# Patient Record
Sex: MALE | Race: WHITE | Employment: FULL TIME | URBAN - METROPOLITAN AREA
[De-identification: names, ages, dates, MRNs, and addresses within clinical notes are randomized per-mention and may not be internally consistent; named-entity substitution may affect disease eponyms.]

---

## 2022-05-03 ENCOUNTER — HOSPITAL ENCOUNTER (OUTPATIENT)
Dept: PHYSICAL THERAPY | Age: 57
Discharge: HOME | End: 2022-05-03
Payer: COMMERCIAL

## 2022-05-03 PROCEDURE — 97162 PT EVAL MOD COMPLEX 30 MIN: CPT

## 2022-05-03 PROCEDURE — 97112 NEUROMUSCULAR REEDUCATION: CPT

## 2022-05-03 NOTE — PROGRESS NOTES
PHYSICAL THERAPY - DAILY TREATMENT NOTE    Patient Name: Alexandre Aguilar        Date: 5/3/2022  : 1965   YES Patient  Verified  Visit #:   1     Insurance: Payor: Estiven Alvarenga / Plan: Uri Connors [id number] PPO / Product Type: PPO /      In time: 4:16 Out time: 4:50   Total Treatment Time: 34     Medicare/BCBS [place] Time Tracking (below)   Total Timed Codes (min):  15 1:1 Treatment Time:  15     TREATMENT AREA =  L/S    SUBJECTIVE    Pain Level (on 0 to 10 scale):  2  / 10 LBP   Medication Changes/New allergies or changes in medical history, any new surgeries or procedures? NO    If yes, update Summary List   Subjective Functional Status/Changes:  []  No changes reported     Pt reports that he has been having back issues since the . Pt reports that he had an exacerbation in  or  and then again about 3 weeks ago. Pt reports that he has pain running from back all the way down his right leg. Pt reports that he went to the ER because of the pain. Pt reports that he was given Valium and instructed to get back patches. Pt reports that he was also given Prednisone, Tylenol arthritis and Diclofenac. Pt denies numbness/tingling, weakness.        OBJECTIVE    Physical Therapy Evaluation     OBJECTIVE  Posture:  Lateral Shift: [] R    [] L     [] +  [x] -  Kyphosis: [x] Increased [] Decreased   []  WNL  Lordosis:  [] Increased [x] Decreased   [] WNL  Pelvic symmetry: [x] WNL    [] Other:    Gait:  [x] Normal     [] Abnormal:    Active Movements: [] N/A   [] Too acute   [] Other:  ROM % AROM % PROM Comments:pain, area   Forward flexion 40-60 75%  R LBP   Extension 20-30 75%  NE   SB right 20-30 75%  NE   SB left 20-30 75%  R LBP   Rotation right 5-10 100%  NE   Rotation left 5-10 100%  NE     Repeated Movements   Effects on present pain: produces (DE), abolishes (A), increases (incr), decreases (decr), centralizes (C), peripheral (PH), no effect (NE)   Pre-Test Sx Flexion Repeated Flexion Extension Repeated Extension Repeated SBL Repeated SBR   Sitting R LBP         Standing None   NE R LBP     Lying R LBP   NE NE N/A N/A   Comments:  Side Glide:  Sustained passive positioning test:    Neuro Screen [x] WNL  Myotome/Dermatome/Reflexes:  Comments:    Dural Mobility:  SLR Sitting: [] R    [] L    [] +    [] -  @ (degrees):           Supine: [] R    [] L    [] +    [x] -  @ (degrees):   Slump Test: [] R    [] L    [] +    [] -  @ (degrees):   Prone Knee Bend: [] R    [] L    [] +    [] -     Strength   L(0-5) R (0-5) N/T   Hip Flexion (L1,2) 5 5 []   Knee Extension (L3,4) 5 5 []   Ankle Dorsiflexion (L4) 5 5 []   Great Toe Extension (L5) 5 5 []   Ankle Plantarflexion (S1) 5 5 []   Knee Flexion (S1,2) 5 5 []   Upper Abdominals   [x]   Lower Abdominals   [x]   Paraspinals   [x]   Back Rotators   [x]   Gluteus Julio 5 5 []   Other - Gluteus Medius 5 5 []     15 min Neuromuscular Re-ed: ANAND GENTILE, L/S roll   Rationale: improve posture and decrease symptoms to improve the patient's ability to perform ADLs/IADLs, functional mobility and gait safely and independently without increased pain/symptoms      During NM min Patient Education:  YES  Reviewed HEP   [x]  Progressed/Changed HEP based on:   Initiated HEP; educated on use of L/S roll     Other Objective/Functional Measures:    See eval     Post Treatment Pain Level (on 0 to 10) scale:   0  / 10     ASSESSMENT    Assessment/Changes in Function:     See plan of care    Justification for Eval Code Complexity:  Patient History : HIGH - HTN, HIV/AIDS, prior h/o LBP  Examination HIGH - see objective  Clinical Presentation: MEDIUM  Clinical Decision Making : MEDIUM - FOTO 50/100     []  See Progress Note/Recertification   Patient will continue to benefit from skilled PT services: see plan of care   Progress toward goals / Updated goals:    See plan of care     PLAN    [x]  Upgrade activities as tolerated YES Continue plan of care   []  Discharge due to :    []  Other:      Therapist: Amie Darby, PT    Date: 5/3/2022 Time: 4:20 PM

## 2022-05-03 NOTE — PROGRESS NOTES
[address] [place] [address] PHYSICAL THERAPY  [address] [place] [address] Fariha Carrera, [address] - Phone: [phone]  Fax: [phone] / [facility]  Patient Name: Pilar Matthews : 1965   Medical   Diagnosis: Other low back pain [M54.59] Treatment Diagnosis: Other low back pain [M54.9]   Onset Date: 2022     Referral Source: Fletcher Quevedo MD [facility]): 5/3/2022   Prior Hospitalization: See medical history Provider #: [id number]   Prior Level of Function: Independent with ADLs, ambulation; working at desk job   Comorbidities: HTN, HIV/AIDS, prior h/o LBP   Medications: Verified on Patient Summary List   The Plan of Care and following information is based on the information from the initial evaluation.   ===========================================================================================  Assessment / key information:  Patient is a 60 y.o. male who presents with complaints of onset of LBP and right LE pain ~ 3 weeks ago. Patient demonstrates decreased/painful L/S ROM with extension bias, decreased core strength, decreased position/activity tolerance and impaired ADL/IADL function, functional mobility and gait. Patient would benefit from skilled PT services to address these issues and improve function.   Thank you for this referral.    FOTO: 50/100, stage 3, moderate difficulty performing usual work or household activities  ==========================================================================================  Eval Complexity: History: HIGH Complexity :3+ comorbidities / personal factors will impact the outcome/ POC Exam:HIGH Complexity : 4+ Standardized tests and measures addressing body structure, function, activity limitation and / or participation in recreation  Presentation: MEDIUM Complexity : Evolving with changing characteristics  Clinical Decision Making:MEDIUM Complexity : FOTO score of 26-74Overall Complexity:MEDIUM    Problem List: pain affecting function, decrease ROM, decrease strength, edema affecting function, impaired gait/ balance, decrease ADL/ functional abilitiies, decrease activity tolerance, decrease flexibility/ joint mobility and decrease transfer abilities   Treatment Plan may include any combination of the following: Therapeutic exercise, Therapeutic activities, Neuromuscular re-education, Physical agent/modality, Gait/balance training, Manual therapy, Patient education, Self Care training, Functional mobility training, Home safety training and Stair training  Patient / Family readiness to learn indicated by: asking questions, trying to perform skills and interest  Persons(s) to be included in education: patient (P)  Barriers to Learning/Limitations: None  Measures taken:    Patient Goal (s): \"No pain. \"   Patient self reported health status: good  Rehabilitation Potential: good   Short Term Goals: To be accomplished in  2-3  weeks:  1. Patient will demonstrate compliance with HEP. 2. Patient will report less than or equal to 4/10 pain at worst to allow increased activity tolerance.  Long Term Goals: To be accomplished in  4-6  weeks:  1. Patient will demonstrate independence with HEP. 2. Patient will report ability to sit for 2 hours with little to no difficulty to facilitate work duties. 3. Patient will score greater than or 67/100 on FOTO, placing him in stage 4, little difficulty performing usual work or household activities and hobbies.    Frequency / Duration:   Patient to be seen  2  times per week for 4-6  weeks:  Patient / Caregiver education and instruction: activity modification and exercises    Therapist Signature: John Causey PT Date: 4/5/7214   Certification Period: NA Time: 4:20 PM   ===========================================================================================  I certify that the above Physical Therapy Services are being furnished while the patient is under my care. I agree with the treatment plan and certify that this therapy is necessary. Physician Signature:        Date:       Time:     Please sign and return to [facility] or you may fax the signed copy to [phone]. Thank you.

## 2022-05-06 ENCOUNTER — APPOINTMENT (OUTPATIENT)
Dept: PHYSICAL THERAPY | Age: 57
End: 2022-05-06
Payer: COMMERCIAL

## 2022-05-06 NOTE — PROGRESS NOTES
[address] [place] [address] PHYSICAL THERAPY  [address] [place] [address] Enrico Carrera, [address] - Phone: [phone]  Fax: [phone] SUMMARY  Patient Name: Michael Jefferson : 1965   Treatment/Medical Diagnosis: Other low back pain [M54.59]   Referral Source: Doreen Romero MD     Date of Initial Visit: 5/3/2022 Attended Visits: 1 Missed Visits: 1     SUMMARY OF TREATMENT  Treatment consisted of initial evaluation (5/3/2022) only, during which trial of repeated L/S extension for management of symptoms was initiated. CURRENT STATUS  Patient presented to clinic on 2022, spoke with front office staff, cancelled 2022 appointment and requested discharge from PT due to not wanting to pay $35/visit copay. RECOMMENDATIONS  Other: Discontinue therapy at patient's request.    If you have any questions/comments please contact us directly at [phone]. Thank you for allowing us to assist in the care of your patient. Therapist Signature: Jong Sung PT Date: 2022     Time: 12:00 PM     NOTE TO PHYSICIAN:  PLEASE COMPLETE THE ORDERS BELOW AND FAX TO   [facility] Physical Therapy: [phone]. If you are unable to process this request in 24 hours please contact our office: [phone].    ___ I have read the above report and request that my patient be discharged from therapy.      Physician Signature:        Date:       Time: